# Patient Record
Sex: FEMALE | Race: WHITE | NOT HISPANIC OR LATINO | Employment: UNEMPLOYED | URBAN - METROPOLITAN AREA
[De-identification: names, ages, dates, MRNs, and addresses within clinical notes are randomized per-mention and may not be internally consistent; named-entity substitution may affect disease eponyms.]

---

## 2018-04-21 ENCOUNTER — APPOINTMENT (EMERGENCY)
Dept: RADIOLOGY | Facility: HOSPITAL | Age: 33
End: 2018-04-21
Payer: COMMERCIAL

## 2018-04-21 ENCOUNTER — HOSPITAL ENCOUNTER (EMERGENCY)
Facility: HOSPITAL | Age: 33
Discharge: HOME | End: 2018-04-21
Attending: EMERGENCY MEDICINE
Payer: COMMERCIAL

## 2018-04-21 VITALS
DIASTOLIC BLOOD PRESSURE: 103 MMHG | WEIGHT: 161 LBS | HEIGHT: 67 IN | TEMPERATURE: 98.4 F | RESPIRATION RATE: 19 BRPM | HEART RATE: 97 BPM | SYSTOLIC BLOOD PRESSURE: 154 MMHG | BODY MASS INDEX: 25.27 KG/M2 | OXYGEN SATURATION: 100 %

## 2018-04-21 DIAGNOSIS — R07.9 CHEST PAIN, UNSPECIFIED TYPE: Primary | ICD-10-CM

## 2018-04-21 LAB
ALBUMIN SERPL BCP-MCNC: 4 G/DL (ref 3.5–5)
ALP SERPL-CCNC: 29 U/L (ref 46–116)
ALT SERPL W P-5'-P-CCNC: 16 U/L (ref 12–78)
ANION GAP SERPL CALCULATED.3IONS-SCNC: 9 MMOL/L (ref 4–13)
AST SERPL W P-5'-P-CCNC: 18 U/L (ref 5–45)
ATRIAL RATE: 81 BPM
ATRIAL RATE: 82 BPM
BASOPHILS # BLD AUTO: 0.06 THOUSANDS/ΜL (ref 0–0.1)
BASOPHILS NFR BLD AUTO: 1 % (ref 0–1)
BILIRUB SERPL-MCNC: 0.6 MG/DL (ref 0.2–1)
BUN SERPL-MCNC: 19 MG/DL (ref 5–25)
CALCIUM SERPL-MCNC: 8.8 MG/DL (ref 8.3–10.1)
CHLORIDE SERPL-SCNC: 104 MMOL/L (ref 100–108)
CO2 SERPL-SCNC: 24 MMOL/L (ref 21–32)
CREAT SERPL-MCNC: 0.72 MG/DL (ref 0.6–1.3)
EOSINOPHIL # BLD AUTO: 0.08 THOUSAND/ΜL (ref 0–0.61)
EOSINOPHIL NFR BLD AUTO: 1 % (ref 0–6)
ERYTHROCYTE [DISTWIDTH] IN BLOOD BY AUTOMATED COUNT: 12.4 % (ref 11.6–15.1)
EXT PREG TEST URINE: NEGATIVE
GFR SERPL CREATININE-BSD FRML MDRD: 110 ML/MIN/1.73SQ M
GLUCOSE SERPL-MCNC: 87 MG/DL (ref 65–140)
HCT VFR BLD AUTO: 36.4 % (ref 34.8–46.1)
HGB BLD-MCNC: 12.3 G/DL (ref 11.5–15.4)
LYMPHOCYTES # BLD AUTO: 2.95 THOUSANDS/ΜL (ref 0.6–4.47)
LYMPHOCYTES NFR BLD AUTO: 43 % (ref 14–44)
MCH RBC QN AUTO: 29.1 PG (ref 26.8–34.3)
MCHC RBC AUTO-ENTMCNC: 33.8 G/DL (ref 31.4–37.4)
MCV RBC AUTO: 86 FL (ref 82–98)
MONOCYTES # BLD AUTO: 0.48 THOUSAND/ΜL (ref 0.17–1.22)
MONOCYTES NFR BLD AUTO: 7 % (ref 4–12)
NEUTROPHILS # BLD AUTO: 3.3 THOUSANDS/ΜL (ref 1.85–7.62)
NEUTS SEG NFR BLD AUTO: 48 % (ref 43–75)
NRBC BLD AUTO-RTO: 0 /100 WBCS
P AXIS: 55 DEGREES
P AXIS: 61 DEGREES
PLATELET # BLD AUTO: 215 THOUSANDS/UL (ref 149–390)
PMV BLD AUTO: 9.6 FL (ref 8.9–12.7)
POTASSIUM SERPL-SCNC: 3.6 MMOL/L (ref 3.5–5.3)
PR INTERVAL: 192 MS
PR INTERVAL: 204 MS
PROT SERPL-MCNC: 7 G/DL (ref 6.4–8.2)
QRS AXIS: 79 DEGREES
QRS AXIS: 86 DEGREES
QRSD INTERVAL: 102 MS
QRSD INTERVAL: 94 MS
QT INTERVAL: 380 MS
QT INTERVAL: 384 MS
QTC INTERVAL: 443 MS
QTC INTERVAL: 446 MS
RBC # BLD AUTO: 4.23 MILLION/UL (ref 3.81–5.12)
SODIUM SERPL-SCNC: 137 MMOL/L (ref 136–145)
T WAVE AXIS: 46 DEGREES
T WAVE AXIS: 58 DEGREES
TROPONIN I SERPL-MCNC: <0.02 NG/ML
VENTRICULAR RATE: 81 BPM
VENTRICULAR RATE: 82 BPM
WBC # BLD AUTO: 6.89 THOUSAND/UL (ref 4.31–10.16)

## 2018-04-21 PROCEDURE — 85025 COMPLETE CBC W/AUTO DIFF WBC: CPT | Performed by: EMERGENCY MEDICINE

## 2018-04-21 PROCEDURE — 99285 EMERGENCY DEPT VISIT HI MDM: CPT

## 2018-04-21 PROCEDURE — 36415 COLL VENOUS BLD VENIPUNCTURE: CPT | Performed by: EMERGENCY MEDICINE

## 2018-04-21 PROCEDURE — 80053 COMPREHEN METABOLIC PANEL: CPT | Performed by: EMERGENCY MEDICINE

## 2018-04-21 PROCEDURE — 81025 URINE PREGNANCY TEST: CPT | Performed by: EMERGENCY MEDICINE

## 2018-04-21 PROCEDURE — 71046 X-RAY EXAM CHEST 2 VIEWS: CPT

## 2018-04-21 PROCEDURE — 84484 ASSAY OF TROPONIN QUANT: CPT | Performed by: EMERGENCY MEDICINE

## 2018-04-21 PROCEDURE — 93010 ELECTROCARDIOGRAM REPORT: CPT | Performed by: INTERNAL MEDICINE

## 2018-04-21 PROCEDURE — 93005 ELECTROCARDIOGRAM TRACING: CPT

## 2018-04-21 RX ORDER — ASPIRIN 81 MG/1
324 TABLET, CHEWABLE ORAL ONCE
Status: COMPLETED | OUTPATIENT
Start: 2018-04-21 | End: 2018-04-21

## 2018-04-21 RX ADMIN — ASPIRIN 81 MG 324 MG: 81 TABLET ORAL at 16:13

## 2018-04-21 NOTE — DISCHARGE INSTRUCTIONS
Chest Pain   WHAT YOU NEED TO KNOW:   Chest pain can be caused by a range of conditions, from not serious to life-threatening  Chest pain can be a symptom of a digestive problem, such as acid reflux or a stomach ulcer  An anxiety attack or a strong emotion, such as anger, can also cause chest pain  Infection, inflammation, or a fracture in the bones or cartilage in your chest can cause pain or discomfort  Sometimes chest pain or pressure is caused by poor blood flow to your heart (angina)  Chest pain may also be caused by life-threatening conditions such as a heart attack or blood clot in your lungs  DISCHARGE INSTRUCTIONS:   Call 911 if:   · You have any of the following signs of a heart attack:      ¨ Squeezing, pressure, or pain in your chest that lasts longer than 5 minutes or returns    ¨ Discomfort or pain in your back, neck, jaw, stomach, or arm     ¨ Trouble breathing    ¨ Nausea or vomiting    ¨ Lightheadedness or a sudden cold sweat, especially with chest pain or trouble breathing    Return to the emergency department if:   · You have chest discomfort that gets worse, even with medicine  · You cough or vomit blood  · Your bowel movements are black or bloody  · You cannot stop vomiting, or it hurts to swallow  Contact your healthcare provider if:   · You have questions or concerns about your condition or care  Medicines:   · Medicines  may be given to treat the cause of your chest pain  Examples include pain medicine, anxiety medicine, or medicines to increase blood flow to your heart  · Do not take certain medicines without asking your healthcare provider first   These include NSAIDs, herbal or vitamin supplements, or hormones (estrogen or progestin)  · Take your medicine as directed  Contact your healthcare provider if you think your medicine is not helping or if you have side effects  Tell him or her if you are allergic to any medicine   Keep a list of the medicines, vitamins, and herbs you take  Include the amounts, and when and why you take them  Bring the list or the pill bottles to follow-up visits  Carry your medicine list with you in case of an emergency  Follow up with your healthcare provider within 72 hours, or as directed: You may need to return for more tests to find the cause of your chest pain  You may be referred to a specialist, such as a cardiologist or gastroenterologist  Write down your questions so you remember to ask them during your visits  Healthy living tips: The following are general healthy guidelines  If your chest pain is caused by a heart problem, your healthcare provider will give you specific guidelines to follow  · Do not smoke  Nicotine and other chemicals in cigarettes and cigars can cause lung and heart damage  Ask your healthcare provider for information if you currently smoke and need help to quit  E-cigarettes or smokeless tobacco still contain nicotine  Talk to your healthcare provider before you use these products  · Eat a variety of healthy, low-fat foods  Healthy foods include fruits, vegetables, whole-grain breads, low-fat dairy products, beans, lean meats, and fish  Ask for more information about a heart healthy diet  · Ask about activity  Your healthcare provider will tell you which activities to limit or avoid  Ask when you can drive, return to work, and have sex  Ask about the best exercise plan for you  · Maintain a healthy weight  Ask your healthcare provider how much you should weigh  Ask him or her to help you create a weight loss plan if you are overweight  · Get the flu and pneumonia vaccines  All adults should get the influenza (flu) vaccine  Get it every year as soon as it becomes available  The pneumococcal vaccine is given to adults aged 72 years or older  The vaccine is given every 5 years to prevent pneumococcal disease, such as pneumonia    © 2017 [employer] Information is for End User's use only and may not be sold, redistributed or otherwise used for commercial purposes  All illustrations and images included in CareNotes® are the copyrighted property of A D [employer] M [employer] Inc  or Francisco Vasquez  The above information is an  only  It is not intended as medical advice for individual conditions or treatments  Talk to your doctor, nurse or pharmacist before following any medical regimen to see if it is safe and effective for you

## 2018-04-21 NOTE — ED PROVIDER NOTES
History  Chief Complaint   Patient presents with    Chest Pain     Pt c/o left sided chest pain today at work  Pt has hx of stroke in twenties  Pt has had nausea but not currently  This 58-year-old female presents with chest pain, diaphoresis  Patient states she has a history of stroke many years ago due to a heart defect  Patient states she had a procedure done to close this hole in her heart  Patient states that for the last several days she has had some intermittent episodes of weakness, dizziness, diaphoresis  Patient states today she began having episodes of sharp substernal chest pain that would last for few seconds at a time  Patient describes as a squeezing sensation  Patient denies any association of the nausea, dizziness, diaphoresis with the chest pain  Patient did not taking medications at home for this  Patient is a smoker  Of note patient also has been told several times this week that her blood pressure has been elevated  Patient does not have a history of hypertension is not taking medication for hypertension  History provided by:  Patient   used: No    Chest Pain   Pain location:  Substernal area  Pain quality: pressure and sharp    Pain radiates to:  Does not radiate  Pain radiates to the back: no    Pain severity:  Mild  Duration:  1 day  Timing:  Intermittent  Progression:  Unchanged  Chronicity:  New  Context: at rest    Context: not breathing, no movement, no stress and no trauma    Relieved by:  None tried  Worsened by:  Nothing tried  Ineffective treatments:  None tried  Associated symptoms: diaphoresis, dizziness and nausea    Associated symptoms: no abdominal pain, no back pain, no cough, no fever, no headache, no palpitations, no shortness of breath, not vomiting and no weakness    Risk factors: smoking        None       Past Medical History:   Diagnosis Date    Stroke [facility])        History reviewed  No pertinent surgical history      History reviewed  No pertinent family history  I have reviewed and agree with the history as documented  Social History   Substance Use Topics    Smoking status: Current Every Day Smoker     Packs/day: 0 50     Types: Cigarettes    Smokeless tobacco: Never Used    Alcohol use No        Review of Systems   Constitutional: Positive for diaphoresis  Negative for activity change, appetite change and fever  HENT: Negative for ear pain, facial swelling, sore throat, tinnitus and voice change  Eyes: Negative for photophobia, pain and redness  Respiratory: Positive for chest tightness  Negative for cough, shortness of breath and wheezing  Cardiovascular: Positive for chest pain  Negative for palpitations and leg swelling  Gastrointestinal: Positive for nausea  Negative for abdominal distention, abdominal pain, constipation, diarrhea and vomiting  Genitourinary: Negative for difficulty urinating, dysuria, flank pain, hematuria and urgency  Musculoskeletal: Negative for back pain, gait problem and neck pain  Skin: Negative for rash and wound  Neurological: Positive for dizziness  Negative for syncope, speech difficulty, weakness and headaches  Psychiatric/Behavioral: Negative for agitation, behavioral problems and confusion  Physical Exam  ED Triage Vitals [04/21/18 1543]   Temperature Pulse Respirations Blood Pressure SpO2   98 4 °F (36 9 °C) 97 19 (!) 154/103 100 %      Temp Source Heart Rate Source Patient Position - Orthostatic VS BP Location FiO2 (%)   Oral Monitor Sitting Left arm --      Pain Score       2           Orthostatic Vital Signs  Vitals:    04/21/18 1543   BP: (!) 154/103   Pulse: 97   Patient Position - Orthostatic VS: Sitting       Physical Exam   Constitutional: She is oriented to person, place, and time  She appears well-developed and well-nourished  She is cooperative  No distress  HENT:   Head: Normocephalic and atraumatic     Mouth/Throat: Oropharynx is clear and moist  Eyes: EOM and lids are normal  Pupils are equal, round, and reactive to light  Right eye exhibits no discharge  Left eye exhibits no discharge  Right conjunctiva is not injected  Left conjunctiva is not injected  Neck: Trachea normal, normal range of motion, full passive range of motion without pain and phonation normal  Neck supple  Cardiovascular: Normal rate, regular rhythm, normal heart sounds and normal pulses  No murmur heard  Pulses:       Dorsalis pedis pulses are 2+ on the right side, and 2+ on the left side  Pulmonary/Chest: Effort normal and breath sounds normal    Abdominal: Soft  She exhibits no distension  There is no tenderness  Musculoskeletal: Normal range of motion  She exhibits no edema  Neurological: She is alert and oriented to person, place, and time  She has normal strength  No cranial nerve deficit or sensory deficit  Coordination normal  GCS eye subscore is 4  GCS verbal subscore is 5  GCS motor subscore is 6  Skin: Skin is warm, dry and intact  Capillary refill takes less than 2 seconds  No rash noted  Psychiatric: She has a normal mood and affect  Her speech is normal and behavior is normal    Vitals reviewed  ED Medications  Medications   aspirin chewable tablet 324 mg (324 mg Oral Given 4/21/18 1613)       Diagnostic Studies  Results Reviewed     Procedure Component Value Units Date/Time    Troponin I [[id number]]  (Normal) Collected:  04/21/18 1618    Lab Status:  Final result Specimen:  Blood from Arm, Right Updated:  04/21/18 1642     Troponin I <0 02 ng/mL     Narrative:         [employer] Chemistry analyzer 99% cutoff is > 0 04 ng/mL in network labs    o cTnI 99% cutoff is useful only when applied to patients in the clinical setting of myocardial ischemia  o cTnI 99% cutoff should be interpreted in the context of clinical history, ECG findings and possibly cardiac imaging to establish correct diagnosis    o cTnI 99% cutoff may be suggestive but clearly not indicative of a coronary event without the clinical setting of myocardial ischemia  Comprehensive metabolic panel [[id number]]  (Abnormal) Collected:  04/21/18 1618    Lab Status:  Final result Specimen:  Blood from Arm, Right Updated:  04/21/18 1640     Sodium 137 mmol/L      Potassium 3 6 mmol/L      Chloride 104 mmol/L      CO2 24 mmol/L      Anion Gap 9 mmol/L      BUN 19 mg/dL      Creatinine 0 72 mg/dL      Glucose 87 mg/dL      Calcium 8 8 mg/dL      AST 18 U/L      ALT 16 U/L      Alkaline Phosphatase 29 (L) U/L      Total Protein 7 0 g/dL      Albumin 4 0 g/dL      Total Bilirubin 0 60 mg/dL      eGFR 110 ml/min/1 73sq m     Narrative:         National Kidney Disease Education Program recommendations are as follows:  GFR calculation is accurate only with a steady state creatinine  Chronic Kidney disease less than 60 ml/min/1 73 sq  meters  Kidney failure less than 15 ml/min/1 73 sq  meters      POCT pregnancy, urine [[id number]]  (Normal) Resulted:  04/21/18 1634    Lab Status:  Final result Updated:  04/21/18 1634     EXT PREG TEST UR (Ref: Negative) NEGATIVE    CBC and differential [[id number]]  (Normal) Collected:  04/21/18 1618    Lab Status:  Final result Specimen:  Blood from Arm, Right Updated:  04/21/18 1624     WBC 6 89 Thousand/uL      RBC 4 23 Million/uL      Hemoglobin 12 3 g/dL      Hematocrit 36 4 %      MCV 86 fL      MCH 29 1 pg      MCHC 33 8 g/dL      RDW 12 4 %      MPV 9 6 fL      Platelets 871 Thousands/uL      nRBC 0 /100 WBCs      Neutrophils Relative 48 %      Lymphocytes Relative 43 %      Monocytes Relative 7 %      Eosinophils Relative 1 %      Basophils Relative 1 %      Neutrophils Absolute 3 30 Thousands/µL      Lymphocytes Absolute 2 95 Thousands/µL      Monocytes Absolute 0 48 Thousand/µL      Eosinophils Absolute 0 08 Thousand/µL      Basophils Absolute 0 06 Thousands/µL                  X-ray chest 2 views   ED Interpretation by Juli Chris MD (04/21 1715)   No acute cardiopulmonary process                 Procedures  ECG 12 Lead Documentation  Date/Time: 4/21/2018 5:11 PM  Performed by: Simone Davalos  Authorized by: Simone Davalos     Indications / Diagnosis:  Chest pain  ECG reviewed by me, the ED Provider: yes    Patient location:  ED  Previous ECG:     Previous ECG:  Unavailable  Interpretation:     Interpretation: non-specific    Rate:     ECG rate:  82    ECG rate assessment: normal    Rhythm:     Rhythm: sinus rhythm    Ectopy:     Ectopy: none    QRS:     QRS axis:  Normal    QRS intervals:  Normal  Conduction:     Conduction: abnormal      Abnormal conduction: incomplete RBBB    ST segments:     ST segments:  Normal  T waves:     T waves: normal             Phone Contacts  ED Phone Contact    ED Course  ED Course          HEART Risk Score    Flowsheet Row Most Recent Value   History  0 Filed at: 04/21/2018 1715   ECG  1 Filed at: 04/21/2018 1715   Age  0 Filed at: 04/21/2018 1715   Risk Factors  1 Filed at: 04/21/2018 1715   Troponin  0 Filed at: 04/21/2018 1715   Heart Score Risk Calculator   History  0 Filed at: 04/21/2018 1715   ECG  1 Filed at: 04/21/2018 1715   Age  0 Filed at: 04/21/2018 1715   Risk Factors  1 Filed at: 04/21/2018 1715   Troponin  0 Filed at: 04/21/2018 1715   HEART Score  2 Filed at: 04/21/2018 1715   HEART Score  2 Filed at: 04/21/2018 1715            PERC Rule for PE    Flowsheet Row Most Recent Value   PERC Rule for PE   Age >=50  0 Filed at: 04/21/2018 1715   HR >=100  0 Filed at: 04/21/2018 1715   O2 Sat on room air < 95%  0 Filed at: 04/21/2018 1715   History of PE or DVT  0 Filed at: 04/21/2018 1715   Recent trauma or surgery  0 Filed at: 04/21/2018 1715   Hemoptysis  0 Filed at: 04/21/2018 1715   Exogenous estrogen  0 Filed at: 04/21/2018 1715   Unilateral leg swelling  0 Filed at: 04/21/2018 1715   [address] for PE Results  0 Filed at: 04/21/2018 1715                      MDM  Number of Diagnoses or Management Options  Chest pain, unspecified type: new and requires workup  Diagnosis management comments: Patient with low risk HEART score of two, PERC negative  Patient's workup here is unremarkable  Patient will be discharged home to follow up with primary care doctor for further evaluation  Amount and/or Complexity of Data Reviewed  Clinical lab tests: ordered and reviewed  Tests in the radiology section of CPT®: reviewed and ordered  Tests in the medicine section of CPT®: ordered and reviewed  Independent visualization of images, tracings, or specimens: yes    Risk of Complications, Morbidity, and/or Mortality  Presenting problems: high  Diagnostic procedures: high  Management options: moderate    Patient Progress  Patient progress: stable    CritCare Time    Disposition  Final diagnoses:   Chest pain, unspecified type     Time reflects when diagnosis was documented in both MDM as applicable and the Disposition within this note     Time User Action Codes Description Comment    4/21/2018  5:16 PM Khalida Israel Add [R07 9] Chest pain, unspecified type       ED Disposition     ED Disposition Condition Comment    Discharge  Aly Sheth discharge to home/self care  Condition at discharge: Stable        Follow-up Information     Follow up With Specialties Details Why Serina Paula  Call in 2 days For further evaluation, and keep your appointment on May 2nd  [phone]          Patient's Medications    No medications on file     No discharge procedures on file      ED Provider  Electronically Signed by           Thomas Walter MD  04/21/18 9996

## 2019-08-24 ENCOUNTER — APPOINTMENT (EMERGENCY)
Dept: RADIOLOGY | Facility: HOSPITAL | Age: 34
End: 2019-08-24
Payer: COMMERCIAL

## 2019-08-24 ENCOUNTER — APPOINTMENT (EMERGENCY)
Dept: CT IMAGING | Facility: HOSPITAL | Age: 34
End: 2019-08-24
Payer: COMMERCIAL

## 2019-08-24 ENCOUNTER — HOSPITAL ENCOUNTER (EMERGENCY)
Facility: HOSPITAL | Age: 34
Discharge: HOME | End: 2019-08-24
Admitting: EMERGENCY MEDICINE
Payer: COMMERCIAL

## 2019-08-24 VITALS
DIASTOLIC BLOOD PRESSURE: 80 MMHG | TEMPERATURE: 97.7 F | HEART RATE: 80 BPM | BODY MASS INDEX: 25.22 KG/M2 | SYSTOLIC BLOOD PRESSURE: 120 MMHG | OXYGEN SATURATION: 98 % | HEIGHT: 67 IN | RESPIRATION RATE: 18 BRPM

## 2019-08-24 DIAGNOSIS — S06.0X0A CONCUSSION WITHOUT LOSS OF CONSCIOUSNESS, INITIAL ENCOUNTER: Primary | ICD-10-CM

## 2019-08-24 LAB
EXT PREG TEST URINE: NEGATIVE
EXT. CONTROL ED NAV: NORMAL

## 2019-08-24 PROCEDURE — 73090 X-RAY EXAM OF FOREARM: CPT

## 2019-08-24 PROCEDURE — 73564 X-RAY EXAM KNEE 4 OR MORE: CPT

## 2019-08-24 PROCEDURE — 70450 CT HEAD/BRAIN W/O DYE: CPT

## 2019-08-24 PROCEDURE — 72125 CT NECK SPINE W/O DYE: CPT

## 2019-08-24 PROCEDURE — 90715 TDAP VACCINE 7 YRS/> IM: CPT | Performed by: NURSE PRACTITIONER

## 2019-08-24 PROCEDURE — 99285 EMERGENCY DEPT VISIT HI MDM: CPT

## 2019-08-24 PROCEDURE — 71046 X-RAY EXAM CHEST 2 VIEWS: CPT

## 2019-08-24 PROCEDURE — 72100 X-RAY EXAM L-S SPINE 2/3 VWS: CPT

## 2019-08-24 PROCEDURE — 81025 URINE PREGNANCY TEST: CPT | Performed by: NURSE PRACTITIONER

## 2019-08-24 PROCEDURE — 90471 IMMUNIZATION ADMIN: CPT

## 2019-08-24 PROCEDURE — 99284 EMERGENCY DEPT VISIT MOD MDM: CPT | Performed by: NURSE PRACTITIONER

## 2019-08-24 RX ORDER — METHOCARBAMOL 500 MG/1
750 TABLET, FILM COATED ORAL EVERY 6 HOURS SCHEDULED
Status: DISCONTINUED | OUTPATIENT
Start: 2019-08-24 | End: 2019-08-24 | Stop reason: HOSPADM

## 2019-08-24 RX ORDER — OXYCODONE HYDROCHLORIDE 5 MG/1
5 TABLET ORAL ONCE
Status: COMPLETED | OUTPATIENT
Start: 2019-08-24 | End: 2019-08-24

## 2019-08-24 RX ORDER — ACETAMINOPHEN 325 MG/1
650 TABLET ORAL ONCE
Status: COMPLETED | OUTPATIENT
Start: 2019-08-24 | End: 2019-08-24

## 2019-08-24 RX ORDER — ACETAMINOPHEN 325 MG/1
975 TABLET ORAL EVERY 6 HOURS
Qty: 30 TABLET | Refills: 0 | Status: SHIPPED | OUTPATIENT
Start: 2019-08-24 | End: 2019-08-24 | Stop reason: SDUPTHER

## 2019-08-24 RX ORDER — ACETAMINOPHEN 325 MG/1
975 TABLET ORAL EVERY 6 HOURS
Qty: 30 TABLET | Refills: 0 | Status: SHIPPED | OUTPATIENT
Start: 2019-08-24

## 2019-08-24 RX ORDER — METOPROLOL SUCCINATE 25 MG/1
25 TABLET, EXTENDED RELEASE ORAL DAILY
COMMUNITY
End: 2021-08-10 | Stop reason: SDUPTHER

## 2019-08-24 RX ORDER — OXYCODONE HYDROCHLORIDE 5 MG/1
5 TABLET ORAL EVERY 4 HOURS PRN
Qty: 30 TABLET | Refills: 0 | Status: SHIPPED | OUTPATIENT
Start: 2019-08-24 | End: 2019-09-03

## 2019-08-24 RX ORDER — METHOCARBAMOL 500 MG/1
750 TABLET, FILM COATED ORAL EVERY 6 HOURS
Qty: 20 TABLET | Refills: 0 | Status: SHIPPED | OUTPATIENT
Start: 2019-08-24 | End: 2019-08-24 | Stop reason: SDUPTHER

## 2019-08-24 RX ORDER — BUTALBITAL, ACETAMINOPHEN AND CAFFEINE 50; 325; 40 MG/1; MG/1; MG/1
1 TABLET ORAL EVERY 4 HOURS PRN
Qty: 20 TABLET | Refills: 0 | Status: SHIPPED | OUTPATIENT
Start: 2019-08-24

## 2019-08-24 RX ORDER — ACETAMINOPHEN 325 MG/1
975 TABLET ORAL EVERY 6 HOURS SCHEDULED
Status: DISCONTINUED | OUTPATIENT
Start: 2019-08-24 | End: 2019-08-24

## 2019-08-24 RX ORDER — METHOCARBAMOL 500 MG/1
750 TABLET, FILM COATED ORAL EVERY 6 HOURS
Qty: 20 TABLET | Refills: 0 | Status: SHIPPED | OUTPATIENT
Start: 2019-08-24

## 2019-08-24 RX ADMIN — ACETAMINOPHEN 650 MG: 325 TABLET ORAL at 17:42

## 2019-08-24 RX ADMIN — METHOCARBAMOL TABLETS 750 MG: 500 TABLET, COATED ORAL at 19:15

## 2019-08-24 RX ADMIN — TETANUS TOXOID, REDUCED DIPHTHERIA TOXOID AND ACELLULAR PERTUSSIS VACCINE, ADSORBED 0.5 ML: 5; 2.5; 8; 8; 2.5 SUSPENSION INTRAMUSCULAR at 19:15

## 2019-08-24 RX ADMIN — OXYCODONE HYDROCHLORIDE 5 MG: 5 TABLET ORAL at 19:15

## 2019-08-24 RX ADMIN — METOPROLOL TARTRATE 25 MG: 25 TABLET ORAL at 19:15

## 2019-08-24 NOTE — ED NOTES
Acetaminophen 975 mg order discontinued, patient given Acetaminophen 650 mg at 1742, MD aware     Dorene Bird, RN  08/24/19 9966
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
## 2019-08-25 NOTE — DISCHARGE INSTRUCTIONS
No work for 1 week  Collar for comfort at this time  Take pain medication as directed  No driving while taking pain medication  Knee immobilizer for comfort  Follow up with PCP in 1 week  If symptoms get worse return to ER

## 2019-08-25 NOTE — ED NOTES
Discharged reviewed with pt  Pt verbalized understanding and has no further questions at this time  Pt wheelchair by this RN to vehicle        Sean Bear RN  08/24/19 2040

## 2021-08-10 ENCOUNTER — OFFICE VISIT (OUTPATIENT)
Dept: OBGYN CLINIC | Facility: CLINIC | Age: 36
End: 2021-08-10

## 2021-08-10 VITALS
HEIGHT: 67 IN | BODY MASS INDEX: 25.18 KG/M2 | SYSTOLIC BLOOD PRESSURE: 128 MMHG | WEIGHT: 160.4 LBS | DIASTOLIC BLOOD PRESSURE: 76 MMHG

## 2021-08-10 DIAGNOSIS — Z01.419 ENCOUNTER FOR ANNUAL ROUTINE GYNECOLOGICAL EXAMINATION: Primary | ICD-10-CM

## 2021-08-10 PROCEDURE — G0145 SCR C/V CYTO,THINLAYER,RESCR: HCPCS | Performed by: STUDENT IN AN ORGANIZED HEALTH CARE EDUCATION/TRAINING PROGRAM

## 2021-08-10 PROCEDURE — G0476 HPV COMBO ASSAY CA SCREEN: HCPCS | Performed by: STUDENT IN AN ORGANIZED HEALTH CARE EDUCATION/TRAINING PROGRAM

## 2021-08-10 PROCEDURE — 99395 PREV VISIT EST AGE 18-39: CPT | Performed by: STUDENT IN AN ORGANIZED HEALTH CARE EDUCATION/TRAINING PROGRAM

## 2021-08-10 RX ORDER — ASPIRIN 81 MG/1
81 TABLET ORAL DAILY
COMMUNITY
Start: 2021-03-22 | End: 2022-03-17

## 2021-08-10 NOTE — PROGRESS NOTES
Melissa Rodriges Royals  1985    Assessment and Plan:  Yearly exam without abnormality      -Pap collected today per patient request  We reviewed [facility]P guidelines for Pap testing today    -Contraception discussed: given history of stroke at young age, would recommend non-hormonal contraception  Discussed Paragard IUD  Pt will return for Mirena removal and Paragard placement  RTO one year for yearly exam or sooner as needed  CC:  Yearly exam    S:  38 y o  female here for yearly exam      Menarche: 13years old    No LMP recorded (lmp unknown)  Patient has had an implant  Contraception: Mirena IUD, placed in appx   Last Pap: 2018 NILM/HPV-   -history of LEEP at age 23, all normal since that time    Current every day smoker, no alcohol  Exercises irregularly    Doing ok overall  Anxious about her health in general, given history stroke, LEEP  Denies hot flushes, dyspareunia, abnormal uterine bleeding, urinary/fecal incontinence, changes in energy levels, mood  She is amenorrheic since the placement of her IUD    Sexual activity: She is sexually active without pain, bleeding or dryness  STD testing:  She does not want STD testing today  Family hx of breast/ovarian cancer: denies  Family hx of colon cancer: m       Current Outpatient Medications:     aspirin (ECOTRIN LOW STRENGTH) 81 mg EC tablet, Take 81 mg by mouth daily, Disp: , Rfl:     metoprolol succinate (KAPSPARGO SPRINKLE) 25 MG extended-release capsule, Take 25 mg by mouth daily Sprinkle on soft food for oral administration  See package insert for gastric tube administration  , Disp: , Rfl:     acetaminophen (TYLENOL) 325 mg tablet, Take 3 tablets (975 mg total) by mouth every 6 (six) hours (Patient not taking: Reported on 8/10/2021), Disp: 30 tablet, Rfl: 0    butalbital-acetaminophen-caffeine (FIORICET,ESGIC) -40 mg per tablet, Take 1 tablet by mouth every 4 (four) hours as needed for headaches (Patient not taking: Reported on 8/10/2021), Disp: 20 tablet, Rfl: 0    methocarbamol (ROBAXIN) 500 mg tablet, Take 1 5 tablets (750 mg total) by mouth every 6 (six) hours (Patient not taking: Reported on 8/10/2021), Disp: 20 tablet, Rfl: 0  Social History     Socioeconomic History    Marital status: Single     Spouse name: Not on file    Number of children: Not on file    Years of education: Not on file    Highest education level: Not on file   Occupational History    Not on file   Tobacco Use    Smoking status: Current Every Day Smoker     Packs/day: 0 25     Types: Cigarettes    Smokeless tobacco: Never Used   Substance and Sexual Activity    Alcohol use: No    Drug use: No    Sexual activity: Yes     Partners: Male     Birth control/protection: I U D  Comment: unsure of type, has had >5years   Other Topics Concern    Not on file   Social History Narrative    Not on file     Social Determinants of Health     Financial Resource Strain:     Difficulty of Paying Living Expenses:    Food Insecurity:     Worried About Running Out of Food in the Last Year:     [address] in the Last Year:    Transportation Needs:     Lack of Transportation (Medical):      Lack of Transportation (Non-Medical):    Physical Activity:     Days of Exercise per Week:     Minutes of Exercise per Session:    Stress:     Feeling of Stress :    Social Connections:     Frequency of Communication with Friends and Family:     Frequency of Social Gatherings with Friends and Family:     Attends [employer] Services:     Active Member of Clubs or Organizations:     Attends Club or Organization Meetings:     Marital Status:    Intimate Partner Violence:     Fear of Current or Ex-Partner:     Emotionally Abused:     Physically Abused:     Sexually Abused:      Family History   Problem Relation Age of Onset    Colon cancer Paternal Uncle       Past Medical History:   Diagnosis Date    Stroke [facility])         Review of Systems Respiratory: Negative  Cardiovascular: Negative  Gastrointestinal: Negative for constipation and diarrhea  Genitourinary: Negative for difficulty urinating, pelvic pain, vaginal bleeding, vaginal discharge, itching or odor  O:  Blood pressure 128/76, height 5' 7" (1 702 m), weight 72 8 kg (160 lb 6 4 oz), not currently breastfeeding  Patient appears well and is not in distress  Neck is supple without masses  Breasts are symmetrical without mass, tenderness, nipple discharge, skin changes or adenopathy  Abdomen is soft and nontender without masses  External genitals are normal without lesions or rashes  Urethral meatus and urethra are normal  Bladder is normal to palpation  Vagina is normal without discharge or bleeding  Cervix is normal without discharge or lesion  Uterus is normal, mobile, nontender without palpable mass  Adnexa are normal, nontender, without palpable mass

## 2021-08-11 ENCOUNTER — TELEPHONE (OUTPATIENT)
Dept: OBGYN CLINIC | Facility: CLINIC | Age: 36
End: 2021-08-11

## 2021-08-11 NOTE — TELEPHONE ENCOUNTER
Melissa returned call and scheduled for 9/13/2021 in [facility] office  Please send device, thanks!

## 2021-08-11 NOTE — TELEPHONE ENCOUNTER
Paragard $1077 00  Removal $214 00  Insertion $215 00    Spoke to patient in regards to self pay pricing for Paragard  Patient responsible to pay for device up front in full and $30 towards removal & insertion  She is aware of pricing, wants to speak to her  then will call back to schedule

## 2021-08-12 LAB
HPV HR 12 DNA CVX QL NAA+PROBE: NEGATIVE
HPV16 DNA CVX QL NAA+PROBE: NEGATIVE
HPV18 DNA CVX QL NAA+PROBE: NEGATIVE

## 2021-08-16 LAB
LAB AP GYN PRIMARY INTERPRETATION: NORMAL
Lab: NORMAL

## 2021-09-13 ENCOUNTER — PROCEDURE VISIT (OUTPATIENT)
Dept: OBGYN CLINIC | Age: 36
End: 2021-09-13

## 2021-09-13 VITALS — BODY MASS INDEX: 25.06 KG/M2 | DIASTOLIC BLOOD PRESSURE: 66 MMHG | WEIGHT: 160 LBS | SYSTOLIC BLOOD PRESSURE: 116 MMHG

## 2021-09-13 DIAGNOSIS — Z30.433 ENCOUNTER FOR IUD REMOVAL AND REINSERTION: Primary | ICD-10-CM

## 2021-09-13 DIAGNOSIS — I63.10 CEREBROVASCULAR ACCIDENT (CVA) DUE TO EMBOLISM OF PRECEREBRAL ARTERY (HCC): ICD-10-CM

## 2021-09-13 PROBLEM — I63.9 CEREBROVASCULAR ACCIDENT (CVA) DUE TO EMBOLISM (HCC): Status: ACTIVE | Noted: 2018-04-26

## 2021-09-13 PROCEDURE — 58300 INSERT INTRAUTERINE DEVICE: CPT | Performed by: STUDENT IN AN ORGANIZED HEALTH CARE EDUCATION/TRAINING PROGRAM

## 2021-09-13 RX ORDER — COPPER 313.4 MG/1
1 INTRAUTERINE DEVICE INTRAUTERINE ONCE
Status: COMPLETED | OUTPATIENT
Start: 2021-09-13 | End: 2021-09-13

## 2021-09-13 RX ADMIN — COPPER 1 INTRA UTERINE DEVICE: 313.4 INTRAUTERINE DEVICE INTRAUTERINE at 16:08

## 2022-06-09 ENCOUNTER — HOSPITAL ENCOUNTER (EMERGENCY)
Facility: HOSPITAL | Age: 37
Discharge: HOME | End: 2022-06-09
Attending: EMERGENCY MEDICINE

## 2022-06-09 ENCOUNTER — APPOINTMENT (EMERGENCY)
Dept: RADIOLOGY | Facility: HOSPITAL | Age: 37
End: 2022-06-09

## 2022-06-09 VITALS
BODY MASS INDEX: 24.28 KG/M2 | SYSTOLIC BLOOD PRESSURE: 118 MMHG | TEMPERATURE: 98.1 F | WEIGHT: 155 LBS | RESPIRATION RATE: 12 BRPM | DIASTOLIC BLOOD PRESSURE: 84 MMHG | HEART RATE: 66 BPM | OXYGEN SATURATION: 99 %

## 2022-06-09 DIAGNOSIS — R07.9 CHEST PAIN, UNSPECIFIED TYPE: Primary | ICD-10-CM

## 2022-06-09 LAB
ALBUMIN SERPL BCP-MCNC: 4.3 G/DL (ref 3.5–5)
ALP SERPL-CCNC: 39 U/L (ref 46–116)
ALT SERPL W P-5'-P-CCNC: 11 U/L (ref 12–78)
ANION GAP SERPL CALCULATED.3IONS-SCNC: 9 MMOL/L (ref 4–13)
AST SERPL W P-5'-P-CCNC: 16 U/L (ref 5–45)
ATRIAL RATE: 71 BPM
ATRIAL RATE: 86 BPM
BASOPHILS # BLD AUTO: 0.08 THOUSANDS/ΜL (ref 0–0.1)
BASOPHILS NFR BLD AUTO: 1 % (ref 0–1)
BILIRUB SERPL-MCNC: 0.35 MG/DL (ref 0.2–1)
BUN SERPL-MCNC: 15 MG/DL (ref 5–25)
CALCIUM SERPL-MCNC: 9 MG/DL (ref 8.3–10.1)
CARDIAC TROPONIN I PNL SERPL HS: <2 NG/L
CARDIAC TROPONIN I PNL SERPL HS: <2 NG/L
CHLORIDE SERPL-SCNC: 104 MMOL/L (ref 100–108)
CO2 SERPL-SCNC: 28 MMOL/L (ref 21–32)
CREAT SERPL-MCNC: 0.99 MG/DL (ref 0.6–1.3)
EOSINOPHIL # BLD AUTO: 0.21 THOUSAND/ΜL (ref 0–0.61)
EOSINOPHIL NFR BLD AUTO: 2 % (ref 0–6)
ERYTHROCYTE [DISTWIDTH] IN BLOOD BY AUTOMATED COUNT: 13 % (ref 11.6–15.1)
GFR SERPL CREATININE-BSD FRML MDRD: 72 ML/MIN/1.73SQ M
GLUCOSE SERPL-MCNC: 88 MG/DL (ref 65–140)
HCT VFR BLD AUTO: 39.4 % (ref 34.8–46.1)
HGB BLD-MCNC: 13.1 G/DL (ref 11.5–15.4)
IMM GRANULOCYTES # BLD AUTO: 0.03 THOUSAND/UL (ref 0–0.2)
IMM GRANULOCYTES NFR BLD AUTO: 0 % (ref 0–2)
LYMPHOCYTES # BLD AUTO: 3.52 THOUSANDS/ΜL (ref 0.6–4.47)
LYMPHOCYTES NFR BLD AUTO: 34 % (ref 14–44)
MCH RBC QN AUTO: 29.4 PG (ref 26.8–34.3)
MCHC RBC AUTO-ENTMCNC: 33.2 G/DL (ref 31.4–37.4)
MCV RBC AUTO: 88 FL (ref 82–98)
MONOCYTES # BLD AUTO: 0.68 THOUSAND/ΜL (ref 0.17–1.22)
MONOCYTES NFR BLD AUTO: 7 % (ref 4–12)
NEUTROPHILS # BLD AUTO: 5.81 THOUSANDS/ΜL (ref 1.85–7.62)
NEUTS SEG NFR BLD AUTO: 56 % (ref 43–75)
NRBC BLD AUTO-RTO: 0 /100 WBCS
P AXIS: 48 DEGREES
P AXIS: 52 DEGREES
PLATELET # BLD AUTO: 254 THOUSANDS/UL (ref 149–390)
PMV BLD AUTO: 9.8 FL (ref 8.9–12.7)
POTASSIUM SERPL-SCNC: 3.8 MMOL/L (ref 3.5–5.3)
PR INTERVAL: 184 MS
PR INTERVAL: 264 MS
PROT SERPL-MCNC: 7.8 G/DL (ref 6.4–8.2)
QRS AXIS: 89 DEGREES
QRS AXIS: 91 DEGREES
QRSD INTERVAL: 88 MS
QRSD INTERVAL: 98 MS
QT INTERVAL: 368 MS
QT INTERVAL: 406 MS
QTC INTERVAL: 440 MS
QTC INTERVAL: 441 MS
RBC # BLD AUTO: 4.46 MILLION/UL (ref 3.81–5.12)
SODIUM SERPL-SCNC: 141 MMOL/L (ref 136–145)
T WAVE AXIS: 75 DEGREES
T WAVE AXIS: 76 DEGREES
VENTRICULAR RATE: 71 BPM
VENTRICULAR RATE: 86 BPM
WBC # BLD AUTO: 10.33 THOUSAND/UL (ref 4.31–10.16)

## 2022-06-09 PROCEDURE — 36415 COLL VENOUS BLD VENIPUNCTURE: CPT | Performed by: EMERGENCY MEDICINE

## 2022-06-09 PROCEDURE — 99285 EMERGENCY DEPT VISIT HI MDM: CPT | Performed by: PHYSICIAN ASSISTANT

## 2022-06-09 PROCEDURE — 93005 ELECTROCARDIOGRAM TRACING: CPT

## 2022-06-09 PROCEDURE — 85025 COMPLETE CBC W/AUTO DIFF WBC: CPT | Performed by: EMERGENCY MEDICINE

## 2022-06-09 PROCEDURE — 71045 X-RAY EXAM CHEST 1 VIEW: CPT

## 2022-06-09 PROCEDURE — 84484 ASSAY OF TROPONIN QUANT: CPT | Performed by: EMERGENCY MEDICINE

## 2022-06-09 PROCEDURE — 99285 EMERGENCY DEPT VISIT HI MDM: CPT

## 2022-06-09 PROCEDURE — 93010 ELECTROCARDIOGRAM REPORT: CPT | Performed by: INTERNAL MEDICINE

## 2022-06-09 PROCEDURE — 80053 COMPREHEN METABOLIC PANEL: CPT | Performed by: EMERGENCY MEDICINE

## 2022-06-09 RX ORDER — ACETAMINOPHEN 325 MG/1
650 TABLET ORAL ONCE
Status: COMPLETED | OUTPATIENT
Start: 2022-06-09 | End: 2022-06-09

## 2022-06-09 RX ADMIN — ACETAMINOPHEN 650 MG: 325 TABLET, FILM COATED ORAL at 21:55

## 2022-06-10 NOTE — ED NOTES
Pt reporting episode of chest pain again in the ED, EKG shot and shown to provider        Darío Payton RN  06/09/22 9045

## 2022-06-10 NOTE — ED PROVIDER NOTES
History  Chief Complaint   Patient presents with    Chest Pain     STARTED 3 DAYS AGO, in the center of the chest and now feels like a fist is clinching her heart      Patient is a 13-year-old female with a past medical history significant for previous strokes presenting to the emergency department for evaluation of intermittent chest pain that started approximately 3 days ago  She states that she gets episodes of chest pains that last approximately 30-40 seconds  It feels like somebody is squeezing her heart  She is also reporting nausea without vomiting  She does not have any associated shortness of breath or diaphoresis  She has never had pain like this in the past   No history of heart attacks  No other complaints at this time  Prior to Admission Medications   Prescriptions Last Dose Informant Patient Reported? Taking?   acetaminophen (TYLENOL) 325 mg tablet  Self No No   Sig: Take 3 tablets (975 mg total) by mouth every 6 (six) hours   Patient not taking: Reported on 8/10/2021   butalbital-acetaminophen-caffeine (FIORICET,ESGIC) -40 mg per tablet  Self No No   Sig: Take 1 tablet by mouth every 4 (four) hours as needed for headaches   Patient not taking: Reported on 8/10/2021   methocarbamol (ROBAXIN) 500 mg tablet  Self No No   Sig: Take 1 5 tablets (750 mg total) by mouth every 6 (six) hours   Patient not taking: Reported on 8/10/2021   metoprolol succinate (KAPSPARGO SPRINKLE) 25 MG extended-release capsule  Self Yes No   Sig: Take 25 mg by mouth daily Sprinkle on soft food for oral administration  See package insert for gastric tube administration  Facility-Administered Medications: None       Past Medical History:   Diagnosis Date    Stroke [facility])        History reviewed  No pertinent surgical history  Family History   Problem Relation Age of Onset    Colon cancer Paternal Uncle      I have reviewed and agree with the history as documented      E-Cigarette/Vaping E-Cigarette/Vaping Substances     Social History     Tobacco Use    Smoking status: Current Every Day Smoker     Packs/day: 0 25     Types: Cigarettes    Smokeless tobacco: Never Used   Substance Use Topics    Alcohol use: No    Drug use: No       Review of Systems   Constitutional: Negative for chills, diaphoresis and fever  HENT: Negative for congestion, facial swelling, nosebleeds, sore throat and voice change  Eyes: Negative for pain and redness  Respiratory: Negative for cough, choking, chest tightness, shortness of breath and stridor  Cardiovascular: Positive for chest pain  Negative for palpitations  Gastrointestinal: Positive for nausea  Negative for abdominal pain, diarrhea and vomiting  Musculoskeletal: Negative for arthralgias, back pain, myalgias, neck pain and neck stiffness  Skin: Negative for color change and rash  Neurological: Negative for dizziness, syncope, facial asymmetry, weakness, light-headedness, numbness and headaches  Psychiatric/Behavioral: Negative for confusion and suicidal ideas  All other systems reviewed and are negative  Physical Exam  Physical Exam  Vitals reviewed  Constitutional:       General: She is not in acute distress  Appearance: Normal appearance  She is not ill-appearing, toxic-appearing or diaphoretic  HENT:      Head: Normocephalic and atraumatic  Right Ear: External ear normal       Left Ear: External ear normal       Nose: Nose normal  No congestion or rhinorrhea  Mouth/Throat:      Mouth: Mucous membranes are moist       Pharynx: Oropharynx is clear  No oropharyngeal exudate or posterior oropharyngeal erythema  Eyes:      General: No scleral icterus  Right eye: No discharge  Left eye: No discharge  Extraocular Movements: Extraocular movements intact  Conjunctiva/sclera: Conjunctivae normal       Pupils: Pupils are equal, round, and reactive to light     Cardiovascular:      Rate and Rhythm: Normal rate and regular rhythm  Pulses: Normal pulses  Heart sounds: Normal heart sounds  No murmur heard  No friction rub  No gallop  Pulmonary:      Effort: Pulmonary effort is normal  No respiratory distress  Breath sounds: Normal breath sounds  No stridor  No wheezing, rhonchi or rales  Abdominal:      General: Abdomen is flat  Palpations: Abdomen is soft  Tenderness: There is no abdominal tenderness  There is no guarding or rebound  Musculoskeletal:      Cervical back: Normal range of motion and neck supple  Right lower leg: No edema  Left lower leg: No edema  Skin:     General: Skin is warm and dry  Capillary Refill: Capillary refill takes less than 2 seconds  Neurological:      General: No focal deficit present  Mental Status: She is alert and oriented to person, place, and time     Psychiatric:         Mood and Affect: Mood normal          Behavior: Behavior normal          Vital Signs  ED Triage Vitals   Temperature Pulse Respirations Blood Pressure SpO2   06/09/22 1930 06/09/22 1930 06/09/22 1930 06/09/22 1930 06/09/22 1930   98 1 °F (36 7 °C) 88 18 126/76 98 %      Temp Source Heart Rate Source Patient Position - Orthostatic VS BP Location FiO2 (%)   06/09/22 1930 06/09/22 1930 06/09/22 1930 06/09/22 1930 --   Tympanic Monitor Sitting Left arm       Pain Score       06/09/22 2155       7           Vitals:    06/09/22 1930 06/09/22 2030 06/09/22 2130 06/09/22 2200   BP: 126/76 122/83 111/82 118/84   Pulse: 88 73 70 66   Patient Position - Orthostatic VS: Sitting Lying Lying Lying         Visual Acuity      ED Medications  Medications   acetaminophen (TYLENOL) tablet 650 mg (650 mg Oral Given 6/9/22 2155)       Diagnostic Studies  Results Reviewed     Procedure Component Value Units Date/Time    HS Troponin I 2hr [[id number]] Collected: 06/09/22 2144    Lab Status: Final result Specimen: Blood from Arm, Right Updated: 06/09/22 2221     hs TnI 2hr <2 ng/L      Delta 2hr hsTnI --    HS Troponin 0hr (reflex protocol) [[id number]]  (Normal) Collected: 06/09/22 1955    Lab Status: Final result Specimen: Blood from Arm, Left Updated: 06/09/22 2026     hs TnI 0hr <2 ng/L     HS Troponin I 4hr [[id number]]     Lab Status: No result Specimen: Blood     Comprehensive metabolic panel [[id number]]  (Abnormal) Collected: 06/09/22 1955    Lab Status: Final result Specimen: Blood from Arm, Left Updated: 06/09/22 2019     Sodium 141 mmol/L      Potassium 3 8 mmol/L      Chloride 104 mmol/L      CO2 28 mmol/L      ANION GAP 9 mmol/L      BUN 15 mg/dL      Creatinine 0 99 mg/dL      Glucose 88 mg/dL      Calcium 9 0 mg/dL      AST 16 U/L      ALT 11 U/L      Alkaline Phosphatase 39 U/L      Total Protein 7 8 g/dL      Albumin 4 3 g/dL      Total Bilirubin 0 35 mg/dL      eGFR 72 ml/min/1 73sq m     Narrative:      [place] guidelines for Chronic Kidney Disease (CKD):     Stage 1 with normal or high GFR (GFR > 90 mL/min/1 73 square meters)    Stage 2 Mild CKD (GFR = 60-89 mL/min/1 73 square meters)    Stage 3A Moderate CKD (GFR = 45-59 mL/min/1 73 square meters)    Stage 3B Moderate CKD (GFR = 30-44 mL/min/1 73 square meters)    Stage 4 Severe CKD (GFR = 15-29 mL/min/1 73 square meters)    Stage 5 End Stage CKD (GFR <15 mL/min/1 73 square meters)  Note: GFR calculation is accurate only with a steady state creatinine    CBC and differential [[id number]]  (Abnormal) Collected: 06/09/22 1955    Lab Status: Final result Specimen: Blood from Arm, Left Updated: 06/09/22 2000     WBC 10 33 Thousand/uL      RBC 4 46 Million/uL      Hemoglobin 13 1 g/dL      Hematocrit 39 4 %      MCV 88 fL      MCH 29 4 pg      MCHC 33 2 g/dL      RDW 13 0 %      MPV 9 8 fL      Platelets 574 Thousands/uL      nRBC 0 /100 WBCs      Neutrophils Relative 56 %      Immat GRANS % 0 %      Lymphocytes Relative 34 %      Monocytes Relative 7 %      Eosinophils Relative 2 % Basophils Relative 1 %      Neutrophils Absolute 5 81 Thousands/µL      Immature Grans Absolute 0 03 Thousand/uL      Lymphocytes Absolute 3 52 Thousands/µL      Monocytes Absolute 0 68 Thousand/µL      Eosinophils Absolute 0 21 Thousand/µL      Basophils Absolute 0 08 Thousands/µL                  XR chest 1 view portable    (Results Pending)              Procedures  Procedures         ED Course  ED Course as of 06/09/22 2306   Thu Jun 09, 2022   2120 EKG reveals normal sinus rhythm at a rate of 86 beats per minute  No ST or T-wave changes  Unchanged from previous EKGs  SBIRT 22yo+    Flowsheet Row Most Recent Value   SBIRT (23 yo +)    In order to provide better care to our patients, we are screening all of our patients for alcohol and drug use  Would it be okay to ask you these screening questions? Yes Filed at: 06/09/2022 4918   Initial Alcohol Screen: US AUDIT-C     1  How often do you have a drink containing alcohol? 0 Filed at: 06/09/2022 1937   2  How many drinks containing alcohol do you have on a typical day you are drinking? 0 Filed at: 06/09/2022 1937   3b  FEMALE Any Age, or MALE 65+: How often do you have 4 or more drinks on one occassion? 0 Filed at: 06/09/2022 1937   Audit-C Score 0 Filed at: 06/09/2022 8646   JULIA: How many times in the past year have you    Used an illegal drug or used a prescription medication for non-medical reasons? Never Filed at: 06/09/2022 1937                    MDM  Number of Diagnoses or Management Options  Chest pain, unspecified type  Diagnosis management comments: Patient presenting for evaluation of 3 days of intermittent chest pain  Upon arrival, she appears comfortable  She is any acute distress  Vital signs  EKG without ischemic changes  Troponin normal x2  Chest x-ray without acute cardiopulmonary disease  Unclear etiology of patient's symptoms at this time, however does not appear to be cardiac in origin    She was discharged home with instructions to follow-up with Cardiology  Strict return precautions were discussed  She is in stable condition at time of discharge  Amount and/or Complexity of Data Reviewed  Clinical lab tests: ordered and reviewed  Tests in the radiology section of CPT®: ordered and reviewed    Patient Progress  Patient progress: stable      Disposition  Final diagnoses:   Chest pain, unspecified type     Time reflects when diagnosis was documented in both MDM as applicable and the Disposition within this note     Time User Action Codes Description Comment    6/9/2022 10:57 PM Virgilio Humphrey Add [R07 9] Chest pain, unspecified type       ED Disposition     ED Disposition   Discharge    Condition   Stable    Date/Time   Thu Jun 9, 2022 10:57 PM    Comment   Wan Jo discharge to home/self care  Follow-up Information     Follow up With Specialties Details Why Contact Info Additional [address] [facility] Emergency Department Emergency Medicine Go to  If symptoms worsen [address] [facility] [id number] [facility] Emergency Department, [address] [place], [place], [address]  [facility] Schedule an appointment as soon as possible for a visit  ASAP for follow up [address] [phone]  [place] [address] Cardiology [address] [facility] [address] [place], [place], [address] [phone]          Patient's Medications   Discharge Prescriptions    No medications on file       No discharge procedures on file      PDMP Review     None          ED Provider  Electronically Signed by           Jannet Perla PA-C  06/09/22 2899

## 2023-10-20 ENCOUNTER — OFFICE VISIT (OUTPATIENT)
Age: 38
End: 2023-10-20

## 2023-10-20 ENCOUNTER — TELEPHONE (OUTPATIENT)
Dept: OBGYN CLINIC | Facility: CLINIC | Age: 38
End: 2023-10-20

## 2023-10-20 VITALS
DIASTOLIC BLOOD PRESSURE: 76 MMHG | SYSTOLIC BLOOD PRESSURE: 120 MMHG | WEIGHT: 148.8 LBS | HEART RATE: 90 BPM | RESPIRATION RATE: 18 BRPM | TEMPERATURE: 97.8 F | BODY MASS INDEX: 23.31 KG/M2 | OXYGEN SATURATION: 98 %

## 2023-10-20 DIAGNOSIS — R30.0 DYSURIA: ICD-10-CM

## 2023-10-20 DIAGNOSIS — N30.01 ACUTE CYSTITIS WITH HEMATURIA: Primary | ICD-10-CM

## 2023-10-20 PROBLEM — Z59.89 UNINSURED MEDICAL EXPENSES: Status: ACTIVE | Noted: 2021-03-22

## 2023-10-20 PROBLEM — M50.20 DISPLACEMENT OF CERVICAL INTERVERTEBRAL DISC: Status: ACTIVE | Noted: 2020-01-29

## 2023-10-20 PROBLEM — I10 ESSENTIAL HYPERTENSION: Status: ACTIVE | Noted: 2018-04-26

## 2023-10-20 PROBLEM — Q21.12 PFO (PATENT FORAMEN OVALE): Status: ACTIVE | Noted: 2018-04-26

## 2023-10-20 LAB
SL AMB  POCT GLUCOSE, UA: NEGATIVE
SL AMB LEUKOCYTE ESTERASE,UA: ABNORMAL
SL AMB POCT BILIRUBIN,UA: NEGATIVE
SL AMB POCT BLOOD,UA: ABNORMAL
SL AMB POCT KETONES,UA: NEGATIVE
SL AMB POCT NITRITE,UA: NEGATIVE
SL AMB POCT PH,UA: 6
SL AMB POCT SPECIFIC GRAVITY,UA: 1.01
SL AMB POCT URINE PROTEIN: ABNORMAL
SL AMB POCT UROBILINOGEN: 0.2

## 2023-10-20 PROCEDURE — 87147 CULTURE TYPE IMMUNOLOGIC: CPT

## 2023-10-20 PROCEDURE — 87086 URINE CULTURE/COLONY COUNT: CPT

## 2023-10-20 PROCEDURE — 99213 OFFICE O/P EST LOW 20 MIN: CPT

## 2023-10-20 PROCEDURE — 87077 CULTURE AEROBIC IDENTIFY: CPT

## 2023-10-20 RX ORDER — CEPHALEXIN 500 MG/1
500 CAPSULE ORAL EVERY 12 HOURS SCHEDULED
Qty: 14 CAPSULE | Refills: 0 | Status: SHIPPED | OUTPATIENT
Start: 2023-10-20 | End: 2023-10-27

## 2023-10-20 NOTE — PATIENT INSTRUCTIONS
Take antibiotic as prescribed for next 7 days, complete entire course even if feeling better. May also take over-the-counter azo (pyridium) for 2-3 days for bladder spasms. Will follow-up with urine culture results if need to change antibiotic. Follow-up with PCP in 3-5 days if no improvement of symptoms. Report to ER if symptoms worsen or develop severe nausea/vomiting with difficulty passing urine.

## 2023-10-20 NOTE — TELEPHONE ENCOUNTER
Patient calling in for a possible UTI. She states the symptoms started about 2 days ago. Symptoms include burning, urgency, pressure. She was up all night. She would like to know what to do. Please call to advise. Thank you!

## 2023-10-20 NOTE — PROGRESS NOTES
[facility]        NAME: Cathie Mcghee is a 42 y.o. female  : 1985    MRN: [id number]  DATE: 2023  TIME: 10:57 AM    Assessment and Plan   Acute cystitis with hematuria [N30.01]  1. Acute cystitis with hematuria  cephalexin (KEFLEX) 500 mg capsule      2. Dysuria  POCT urine dip    Urine culture        POC urine reveals moderate amount of leukocytes and small amount of blood. Will start on Keflex and send urine culture. Will follow-up if need to change antibiotic based on urine culture results. VSS in clinic, appears in no acute distress. Educated on use of OTC products for symptoms. Advised close follow-up with PCP or to report to the ER if symptoms worsen. Patient verbalizes understanding and agreeable to plan. Patient Instructions     Take antibiotic as prescribed for next 7 days, complete entire course even if feeling better. May also take over-the-counter azo (pyridium) for 2-3 days for bladder spasms. Will follow-up with urine culture results if need to change antibiotic. Follow-up with PCP in 3-5 days if no improvement of symptoms. Report to ER if symptoms worsen or develop severe nausea/vomiting with difficulty passing urine. Chief Complaint     Chief Complaint   Patient presents with    Possible UTI     Burning, urgency, frequency with urination X 3 days. No relief with OTC remedies         History of Present Illness       42year old female presents for urinary frequency, urgency, and burning with urination that started yesterday. She does report a history of UTI's and interstitial cystitis "years ago as a teenager." She denies concern for STD's or pregnancy at this time. Her LMP was on 10/08/23 and was a normal cycle for her. She relates she had some nausea that resolved and suprapubic abdominal tenderness/pressure but denies associated urinary odors, discharges, vomiting, back pain, or difficulty passing urine.  She has been taking azo for the past two days for symptoms with some relief. Urinary Tract Infection   This is a new problem. The current episode started in the past 7 days. The problem occurs every urination. The problem has been unchanged. The quality of the pain is described as burning. The pain is at a severity of 0/10. The patient is experiencing no pain. There has been no fever. She is Sexually active. There is No history of pyelonephritis. Associated symptoms include frequency, hesitancy and urgency. Pertinent negatives include no chills, discharge, flank pain, hematuria, nausea, possible pregnancy, sweats or vomiting. Treatments tried: Azo. The treatment provided mild relief. Her past medical history is significant for urinary stasis. There is no history of kidney stones, recurrent UTIs or a urological procedure. Review of Systems   Review of Systems   Constitutional:  Negative for activity change, appetite change, chills, fatigue and fever. Respiratory:  Negative for cough, chest tightness and shortness of breath. Cardiovascular:  Negative for chest pain. Gastrointestinal:  Positive for abdominal pain. Negative for constipation, diarrhea, nausea and vomiting. Genitourinary:  Positive for dysuria, frequency, hesitancy, pelvic pain and urgency. Negative for decreased urine volume, difficulty urinating, dyspareunia, flank pain, genital sores, hematuria, menstrual problem, vaginal bleeding, vaginal discharge and vaginal pain. Musculoskeletal:  Negative for back pain and myalgias. Skin:  Negative for color change and pallor. Allergic/Immunologic: Negative for environmental allergies and food allergies. Neurological:  Negative for dizziness, light-headedness and headaches.          Current Medications       Current Outpatient Medications:     cephalexin (KEFLEX) 500 mg capsule, Take 1 capsule (500 mg total) by mouth every 12 (twelve) hours for 7 days, Disp: 14 capsule, Rfl: 0    acetaminophen (TYLENOL) 325 mg tablet, Take 3 tablets (208 mg total) by mouth every 6 (six) hours (Patient not taking: Reported on 8/10/2021), Disp: 30 tablet, Rfl: 0    butalbital-acetaminophen-caffeine (FIORICET,ESGIC) -40 mg per tablet, Take 1 tablet by mouth every 4 (four) hours as needed for headaches (Patient not taking: Reported on 8/10/2021), Disp: 20 tablet, Rfl: 0    methocarbamol (ROBAXIN) 500 mg tablet, Take 1.5 tablets (750 mg total) by mouth every 6 (six) hours (Patient not taking: Reported on 8/10/2021), Disp: 20 tablet, Rfl: 0    metoprolol succinate (KAPSPARGO SPRINKLE) 25 MG extended-release capsule, Take 25 mg by mouth daily Sprinkle on soft food for oral administration. See package insert for gastric tube administration. (Patient not taking: Reported on 10/20/2023), Disp: , Rfl:     Current Allergies     Allergies as of 10/20/2023    (No Known Allergies)            The following portions of the patient's history were reviewed and updated as appropriate: allergies, current medications, past family history, past medical history, past social history, past surgical history and problem list.     Past Medical History:   Diagnosis Date    Stroke [facility])        History reviewed. No pertinent surgical history. Family History   Problem Relation Age of Onset    Colon cancer Paternal Uncle          Medications have been verified. Objective   /76 (BP Location: Left arm, Patient Position: Sitting, Cuff Size: Adult)   Pulse 90   Temp 97.8 °F (36.6 °C) (Tympanic)   Resp 18   Wt 67.5 kg (148 lb 12.8 oz)   LMP 10/08/2023 (Exact Date)   SpO2 98%   BMI 23.31 kg/m²        Physical Exam     Physical Exam  Vitals and nursing note reviewed. Constitutional:       General: She is awake. Appearance: Normal appearance. She is well-developed and normal weight. HENT:      Head: Normocephalic and atraumatic. Right Ear: Hearing normal.      Left Ear: Hearing normal.      Mouth/Throat:      Lips: Pink.       Mouth: Mucous membranes are moist. Cardiovascular:      Rate and Rhythm: Normal rate and regular rhythm. Pulses: Normal pulses. Heart sounds: Normal heart sounds. Pulmonary:      Effort: Pulmonary effort is normal.      Breath sounds: Normal breath sounds. Abdominal:      General: Abdomen is flat. Bowel sounds are normal.      Palpations: Abdomen is soft. Tenderness: There is abdominal tenderness in the suprapubic area. There is no right CVA tenderness or left CVA tenderness. Musculoskeletal:      Cervical back: Neck supple. Neurological:      General: No focal deficit present. Mental Status: She is alert and oriented to person, place, and time. Psychiatric:         Mood and Affect: Mood normal.         Behavior: Behavior normal. Behavior is cooperative. Thought Content:  Thought content normal.         Judgment: Judgment normal.

## 2023-10-22 LAB — BACTERIA UR CULT: ABNORMAL
